# Patient Record
Sex: FEMALE | Race: WHITE | NOT HISPANIC OR LATINO | Employment: FULL TIME | URBAN - METROPOLITAN AREA
[De-identification: names, ages, dates, MRNs, and addresses within clinical notes are randomized per-mention and may not be internally consistent; named-entity substitution may affect disease eponyms.]

---

## 2023-10-16 ENCOUNTER — LAB (OUTPATIENT)
Dept: LAB | Facility: LAB | Age: 53
End: 2023-10-16
Payer: COMMERCIAL

## 2023-10-16 DIAGNOSIS — R73.9 HYPERGLYCEMIA: ICD-10-CM

## 2023-10-16 DIAGNOSIS — E55.9 VITAMIN D DEFICIENCY, UNSPECIFIED: ICD-10-CM

## 2023-10-16 DIAGNOSIS — E53.8 DEFICIENCY OF OTHER SPECIFIED B GROUP VITAMINS: ICD-10-CM

## 2023-10-16 DIAGNOSIS — E03.9 HYPOTHYROIDISM, UNSPECIFIED: Primary | ICD-10-CM

## 2023-10-16 LAB
ALBUMIN SERPL BCP-MCNC: 4.6 G/DL (ref 3.4–5)
ALP SERPL-CCNC: 112 U/L (ref 33–110)
ALT SERPL W P-5'-P-CCNC: 20 U/L (ref 7–45)
ANION GAP SERPL CALC-SCNC: 15 MMOL/L (ref 10–20)
AST SERPL W P-5'-P-CCNC: 22 U/L (ref 9–39)
BASOPHILS # BLD AUTO: 0.05 X10*3/UL (ref 0–0.1)
BASOPHILS NFR BLD AUTO: 0.6 %
BILIRUB SERPL-MCNC: 0.5 MG/DL (ref 0–1.2)
BUN SERPL-MCNC: 17 MG/DL (ref 6–23)
CALCIUM SERPL-MCNC: 9.7 MG/DL (ref 8.6–10.3)
CHLORIDE SERPL-SCNC: 97 MMOL/L (ref 98–107)
CHOLEST SERPL-MCNC: 242 MG/DL (ref 0–199)
CHOLESTEROL/HDL RATIO: 2.6
CO2 SERPL-SCNC: 28 MMOL/L (ref 21–32)
CREAT SERPL-MCNC: 0.6 MG/DL (ref 0.5–1.05)
EOSINOPHIL # BLD AUTO: 0.16 X10*3/UL (ref 0–0.7)
EOSINOPHIL NFR BLD AUTO: 2 %
ERYTHROCYTE [DISTWIDTH] IN BLOOD BY AUTOMATED COUNT: 13.1 % (ref 11.5–14.5)
GFR SERPL CREATININE-BSD FRML MDRD: >90 ML/MIN/1.73M*2
GLUCOSE SERPL-MCNC: 113 MG/DL (ref 74–99)
HCT VFR BLD AUTO: 41.6 % (ref 36–46)
HDLC SERPL-MCNC: 94.8 MG/DL
HGB BLD-MCNC: 13.1 G/DL (ref 12–16)
IMM GRANULOCYTES # BLD AUTO: 0.02 X10*3/UL (ref 0–0.7)
IMM GRANULOCYTES NFR BLD AUTO: 0.2 % (ref 0–0.9)
LDLC SERPL CALC-MCNC: 130 MG/DL
LYMPHOCYTES # BLD AUTO: 1.91 X10*3/UL (ref 1.2–4.8)
LYMPHOCYTES NFR BLD AUTO: 23.6 %
MCH RBC QN AUTO: 28.5 PG (ref 26–34)
MCHC RBC AUTO-ENTMCNC: 31.5 G/DL (ref 32–36)
MCV RBC AUTO: 90 FL (ref 80–100)
MONOCYTES # BLD AUTO: 0.77 X10*3/UL (ref 0.1–1)
MONOCYTES NFR BLD AUTO: 9.5 %
NEUTROPHILS # BLD AUTO: 5.17 X10*3/UL (ref 1.2–7.7)
NEUTROPHILS NFR BLD AUTO: 64.1 %
NON HDL CHOLESTEROL: 147 MG/DL (ref 0–149)
NRBC BLD-RTO: 0 /100 WBCS (ref 0–0)
PLATELET # BLD AUTO: 288 X10*3/UL (ref 150–450)
PMV BLD AUTO: 10.3 FL (ref 7.5–11.5)
POTASSIUM SERPL-SCNC: 4.6 MMOL/L (ref 3.5–5.3)
PROT SERPL-MCNC: 7.2 G/DL (ref 6.4–8.2)
RBC # BLD AUTO: 4.6 X10*6/UL (ref 4–5.2)
SODIUM SERPL-SCNC: 135 MMOL/L (ref 136–145)
T4 FREE SERPL-MCNC: 1.15 NG/DL (ref 0.61–1.12)
TRIGL SERPL-MCNC: 84 MG/DL (ref 0–149)
TSH SERPL-ACNC: 0.26 MIU/L (ref 0.44–3.98)
VLDL: 17 MG/DL (ref 0–40)
WBC # BLD AUTO: 8.1 X10*3/UL (ref 4.4–11.3)

## 2023-10-16 PROCEDURE — 80061 LIPID PANEL: CPT

## 2023-10-16 PROCEDURE — 82607 VITAMIN B-12: CPT

## 2023-10-16 PROCEDURE — 85025 COMPLETE CBC W/AUTO DIFF WBC: CPT

## 2023-10-16 PROCEDURE — 36415 COLL VENOUS BLD VENIPUNCTURE: CPT

## 2023-10-16 PROCEDURE — 80053 COMPREHEN METABOLIC PANEL: CPT

## 2023-10-16 PROCEDURE — 84439 ASSAY OF FREE THYROXINE: CPT

## 2023-10-16 PROCEDURE — 83036 HEMOGLOBIN GLYCOSYLATED A1C: CPT

## 2023-10-16 PROCEDURE — 84443 ASSAY THYROID STIM HORMONE: CPT

## 2023-10-16 PROCEDURE — 82306 VITAMIN D 25 HYDROXY: CPT

## 2023-10-17 DIAGNOSIS — E03.9 HYPOTHYROIDISM, UNSPECIFIED TYPE: Primary | ICD-10-CM

## 2023-10-17 DIAGNOSIS — R73.9 HYPERGLYCEMIA: ICD-10-CM

## 2023-10-17 LAB
25(OH)D3 SERPL-MCNC: 68 NG/ML (ref 30–100)
EST. AVERAGE GLUCOSE BLD GHB EST-MCNC: 105 MG/DL
HBA1C MFR BLD: 5.3 %
VIT B12 SERPL-MCNC: >2000 PG/ML (ref 211–911)

## 2023-10-17 RX ORDER — LEVOTHYROXINE SODIUM 88 UG/1
88 TABLET ORAL DAILY
Qty: 30 TABLET | Refills: 11 | Status: CANCELLED | OUTPATIENT
Start: 2023-10-17 | End: 2024-10-16

## 2023-10-18 PROBLEM — N83.209 OVARIAN CYST: Status: ACTIVE | Noted: 2023-10-18

## 2023-10-18 PROBLEM — N91.2 AMENORRHEA: Status: ACTIVE | Noted: 2023-10-18

## 2023-10-18 PROBLEM — N92.0 MENORRHAGIA: Status: ACTIVE | Noted: 2023-10-18

## 2023-10-18 PROBLEM — N95.1 HOT FLASHES, MENOPAUSAL: Status: ACTIVE | Noted: 2023-10-18

## 2023-10-18 PROBLEM — R92.8 ABNORMAL MAMMOGRAM: Status: ACTIVE | Noted: 2023-10-18

## 2023-10-18 PROBLEM — F17.200 CURRENT EVERY DAY SMOKER: Status: ACTIVE | Noted: 2023-10-18

## 2023-10-18 PROBLEM — F31.9 BIPOLAR AFFECTIVE DISORDER (MULTI): Status: ACTIVE | Noted: 2023-10-18

## 2023-10-18 PROBLEM — R53.83 FATIGUE: Status: ACTIVE | Noted: 2023-10-18

## 2023-10-18 PROBLEM — R55 SYNCOPE: Status: ACTIVE | Noted: 2023-10-18

## 2023-10-18 PROBLEM — E55.9 VITAMIN D DEFICIENCY: Status: ACTIVE | Noted: 2023-10-18

## 2023-10-18 PROBLEM — R51.9 HEADACHE: Status: ACTIVE | Noted: 2023-10-18

## 2023-10-18 PROBLEM — E53.8 VITAMIN B12 DEFICIENCY: Status: ACTIVE | Noted: 2023-10-18

## 2023-10-18 RX ORDER — MULTIVITAMIN/IRON/FOLIC ACID 18MG-0.4MG
1 TABLET ORAL DAILY
COMMUNITY
End: 2023-11-21 | Stop reason: ALTCHOICE

## 2023-10-18 RX ORDER — LAMOTRIGINE 200 MG/1
1 TABLET ORAL 2 TIMES DAILY
COMMUNITY

## 2023-10-18 RX ORDER — ESCITALOPRAM OXALATE 20 MG/1
40 TABLET ORAL DAILY
COMMUNITY

## 2023-10-18 RX ORDER — METHYLPHENIDATE HYDROCHLORIDE 10 MG/1
10 TABLET ORAL 4 TIMES DAILY
COMMUNITY

## 2023-10-18 RX ORDER — POLYETHYLENE GLYCOL 3350, SODIUM CHLORIDE, SODIUM BICARBONATE, POTASSIUM CHLORIDE 420; 11.2; 5.72; 1.48 G/4L; G/4L; G/4L; G/4L
POWDER, FOR SOLUTION ORAL
COMMUNITY
Start: 2023-06-30

## 2023-10-18 RX ORDER — DULOXETIN HYDROCHLORIDE 60 MG/1
60 CAPSULE, DELAYED RELEASE ORAL DAILY
COMMUNITY

## 2023-10-18 RX ORDER — LEVOTHYROXINE SODIUM 88 UG/1
88 TABLET ORAL DAILY
Qty: 90 TABLET | Refills: 0 | Status: SHIPPED | OUTPATIENT
Start: 2023-10-18 | End: 2024-01-29 | Stop reason: SDUPTHER

## 2023-10-18 RX ORDER — DULOXETIN HYDROCHLORIDE 30 MG/1
30 CAPSULE, DELAYED RELEASE ORAL DAILY
COMMUNITY

## 2023-10-18 RX ORDER — BUSPIRONE HYDROCHLORIDE 10 MG/1
10 TABLET ORAL 2 TIMES DAILY
COMMUNITY

## 2023-10-18 RX ORDER — LEVOTHYROXINE SODIUM 100 UG/1
100 TABLET ORAL DAILY
COMMUNITY
End: 2023-10-19 | Stop reason: ALTCHOICE

## 2023-10-18 RX ORDER — ASPIRIN 325 MG
1 TABLET, DELAYED RELEASE (ENTERIC COATED) ORAL DAILY
COMMUNITY
End: 2023-11-21 | Stop reason: ALTCHOICE

## 2023-10-19 ENCOUNTER — OFFICE VISIT (OUTPATIENT)
Dept: PRIMARY CARE | Facility: CLINIC | Age: 53
End: 2023-10-19
Payer: COMMERCIAL

## 2023-10-19 VITALS
HEIGHT: 60 IN | WEIGHT: 111.8 LBS | BODY MASS INDEX: 21.95 KG/M2 | SYSTOLIC BLOOD PRESSURE: 106 MMHG | TEMPERATURE: 97 F | DIASTOLIC BLOOD PRESSURE: 70 MMHG | HEART RATE: 73 BPM

## 2023-10-19 DIAGNOSIS — R74.8 ALKALINE PHOSPHATASE ELEVATION: ICD-10-CM

## 2023-10-19 DIAGNOSIS — R73.9 HYPERGLYCEMIA: ICD-10-CM

## 2023-10-19 DIAGNOSIS — E53.8 VITAMIN B12 DEFICIENCY: ICD-10-CM

## 2023-10-19 DIAGNOSIS — F31.9 BIPOLAR AFFECTIVE DISORDER, REMISSION STATUS UNSPECIFIED (MULTI): ICD-10-CM

## 2023-10-19 DIAGNOSIS — J30.2 SEASONAL ALLERGIC RHINITIS, UNSPECIFIED TRIGGER: ICD-10-CM

## 2023-10-19 DIAGNOSIS — E78.5 HYPERLIPIDEMIA, UNSPECIFIED HYPERLIPIDEMIA TYPE: ICD-10-CM

## 2023-10-19 DIAGNOSIS — F17.200 CURRENT EVERY DAY SMOKER: ICD-10-CM

## 2023-10-19 DIAGNOSIS — E03.9 HYPOTHYROIDISM, UNSPECIFIED TYPE: Primary | ICD-10-CM

## 2023-10-19 PROCEDURE — 99214 OFFICE O/P EST MOD 30 MIN: CPT | Performed by: FAMILY MEDICINE

## 2023-10-19 RX ORDER — LORATADINE 10 MG/1
10 TABLET ORAL DAILY
Qty: 90 TABLET | Refills: 1 | Status: SHIPPED | OUTPATIENT
Start: 2023-10-19 | End: 2024-04-22

## 2023-10-19 ASSESSMENT — PATIENT HEALTH QUESTIONNAIRE - PHQ9
SUM OF ALL RESPONSES TO PHQ9 QUESTIONS 1 AND 2: 0
2. FEELING DOWN, DEPRESSED OR HOPELESS: NOT AT ALL
1. LITTLE INTEREST OR PLEASURE IN DOING THINGS: NOT AT ALL

## 2023-10-19 NOTE — PROGRESS NOTES
Subjective   Patient ID: Donna Pak is a 53 y.o. female who presents for Follow-up.  Patient reports that overall she has been doing well.  She states that for the last month or so she has had some postnasal drip and occasional cough.  She denies any chest pain or shortness of breath.  No fever.  No vomiting or diarrhea.  We reviewed her recent blood work showed her thyroid is overactive.  She has not started her new dose of Synthroid yet.  Her cholesterol and blood sugar was also elevated and she reports that she was not fasting for her blood work.  Her B12 level is also high.  She denies any other concerns.  HPI  Social History     Socioeconomic History    Marital status: Single     Spouse name: Not on file    Number of children: Not on file    Years of education: Not on file    Highest education level: Not on file   Occupational History    Not on file   Tobacco Use    Smoking status: Every Day     Packs/day: .5     Types: Cigarettes    Smokeless tobacco: Never   Substance and Sexual Activity    Alcohol use: Not Currently    Drug use: Never    Sexual activity: Not on file   Other Topics Concern    Not on file   Social History Narrative    Not on file     Social Determinants of Health     Financial Resource Strain: Not on file   Food Insecurity: Not on file   Transportation Needs: Not on file   Physical Activity: Not on file   Stress: Not on file   Social Connections: Not on file   Intimate Partner Violence: Not on file   Housing Stability: Not on file     Current Outpatient Medications   Medication Sig Dispense Refill    busPIRone (Buspar) 10 mg tablet Take 1 tablet (10 mg) by mouth 2 times a day.      cholecalciferol (Vitamin D-3) 50,000 unit capsule Take 1 capsule (50,000 Units) by mouth once daily.      DULoxetine (Cymbalta) 30 mg DR capsule Take 1 capsule (30 mg) by mouth once daily.      DULoxetine (Cymbalta) 60 mg DR capsule Take 1 capsule (60 mg) by mouth once daily.      escitalopram (Lexapro) 20 mg  "tablet Take 2 tablets (40 mg) by mouth once daily.      LaMICtal 200 mg tablet Take 1 tablet (200 mg) by mouth 2 times a day.      methylphenidate (Ritalin) 10 mg tablet Take 1 tablet (10 mg) by mouth 4 times a day.      ASPIRIN ORAL Take 1 tablet by mouth once daily as needed.      b complex 0.4 mg tablet Take 1 tablet by mouth once daily.      levothyroxine (Synthroid, Levoxyl) 88 mcg tablet Take 1 tablet (88 mcg) by mouth once daily. (Patient not taking: Reported on 10/19/2023) 90 tablet 0    loratadine (Claritin) 10 mg tablet Take 1 tablet (10 mg) by mouth once daily. 90 tablet 1    polyethylene glycol-electrolytes 420 gram solution Take as directed       No current facility-administered medications for this visit.     Family History   Problem Relation Name Age of Onset    Lung cancer Mother      Brain cancer Mother      Other (cardiac disorder) Father      Hypertension Father      Breast cancer Father's Sister      Breast cancer Maternal Grandfather      Breast cancer Paternal Grandmother      Other (cardiac disorder) Other uncle      Review of Systems    Review of Systems negative except as noted in HPI and Chief complaint.     Objective                 /70 (BP Location: Left arm, Patient Position: Sitting)   Pulse 73   Temp 36.1 °C (97 °F)   Ht 1.511 m (4' 11.5\")   Wt 50.7 kg (111 lb 12.8 oz)   BMI 22.20 kg/m²    Physical Exam  Vitals reviewed.   HENT:      Head: Normocephalic and atraumatic.      Right Ear: Tympanic membrane normal.      Left Ear: Tympanic membrane normal.      Nose: Nose normal.   Eyes:      Extraocular Movements: Extraocular movements intact.      Conjunctiva/sclera: Conjunctivae normal.      Pupils: Pupils are equal, round, and reactive to light.   Cardiovascular:      Rate and Rhythm: Normal rate and regular rhythm.      Pulses: Normal pulses.   Pulmonary:      Effort: Pulmonary effort is normal.      Breath sounds: Normal breath sounds.   Abdominal:      General: There is no " distension.      Palpations: Abdomen is soft.      Tenderness: There is no abdominal tenderness.   Musculoskeletal:         General: Normal range of motion.      Cervical back: Normal range of motion and neck supple.   Lymphadenopathy:      Cervical: No cervical adenopathy.   Neurological:      General: No focal deficit present.      Mental Status: She is alert.       Results for orders placed or performed in visit on 10/16/23 (from the past 96 hour(s))   CBC and Auto Differential   Result Value Ref Range    WBC 8.1 4.4 - 11.3 x10*3/uL    nRBC 0.0 0.0 - 0.0 /100 WBCs    RBC 4.60 4.00 - 5.20 x10*6/uL    Hemoglobin 13.1 12.0 - 16.0 g/dL    Hematocrit 41.6 36.0 - 46.0 %    MCV 90 80 - 100 fL    MCH 28.5 26.0 - 34.0 pg    MCHC 31.5 (L) 32.0 - 36.0 g/dL    RDW 13.1 11.5 - 14.5 %    Platelets 288 150 - 450 x10*3/uL    MPV 10.3 7.5 - 11.5 fL    Neutrophils % 64.1 40.0 - 80.0 %    Immature Granulocytes %, Automated 0.2 0.0 - 0.9 %    Lymphocytes % 23.6 13.0 - 44.0 %    Monocytes % 9.5 2.0 - 10.0 %    Eosinophils % 2.0 0.0 - 6.0 %    Basophils % 0.6 0.0 - 2.0 %    Neutrophils Absolute 5.17 1.20 - 7.70 x10*3/uL    Immature Granulocytes Absolute, Automated 0.02 0.00 - 0.70 x10*3/uL    Lymphocytes Absolute 1.91 1.20 - 4.80 x10*3/uL    Monocytes Absolute 0.77 0.10 - 1.00 x10*3/uL    Eosinophils Absolute 0.16 0.00 - 0.70 x10*3/uL    Basophils Absolute 0.05 0.00 - 0.10 x10*3/uL   Comprehensive Metabolic Panel   Result Value Ref Range    Glucose 113 (H) 74 - 99 mg/dL    Sodium 135 (L) 136 - 145 mmol/L    Potassium 4.6 3.5 - 5.3 mmol/L    Chloride 97 (L) 98 - 107 mmol/L    Bicarbonate 28 21 - 32 mmol/L    Anion Gap 15 10 - 20 mmol/L    Urea Nitrogen 17 6 - 23 mg/dL    Creatinine 0.60 0.50 - 1.05 mg/dL    eGFR >90 >60 mL/min/1.73m*2    Calcium 9.7 8.6 - 10.3 mg/dL    Albumin 4.6 3.4 - 5.0 g/dL    Alkaline Phosphatase 112 (H) 33 - 110 U/L    Total Protein 7.2 6.4 - 8.2 g/dL    AST 22 9 - 39 U/L    Bilirubin, Total 0.5 0.0 - 1.2 mg/dL     ALT 20 7 - 45 U/L   Lipid Panel   Result Value Ref Range    Cholesterol 242 (H) 0 - 199 mg/dL    HDL-Cholesterol 94.8 mg/dL    Cholesterol/HDL Ratio 2.6     LDL Calculated 130 (H) <=99 mg/dL    VLDL 17 0 - 40 mg/dL    Triglycerides 84 0 - 149 mg/dL    Non HDL Cholesterol 147 0 - 149 mg/dL   TSH with reflex to Free T4 if abnormal   Result Value Ref Range    Thyroid Stimulating Hormone 0.26 (L) 0.44 - 3.98 mIU/L   Vitamin B12   Result Value Ref Range    Vitamin B12 >2,000 (H) 211 - 911 pg/mL   Vitamin D 25-Hydroxy,Total (for eval of Vitamin D levels)   Result Value Ref Range    Vitamin D, 25-Hydroxy, Total 68 30 - 100 ng/mL   Thyroxine, Free   Result Value Ref Range    Thyroxine, Free 1.15 (H) 0.61 - 1.12 ng/dL   Hemoglobin A1C   Result Value Ref Range    Hemoglobin A1C 5.3 see below %    Estimated Average Glucose 105 Not Established mg/dL       Assessment/Plan   Problem List Items Addressed This Visit       Hypothyroidism - Primary     Blood work shows thyroid is overactive.  Decrease Synthroid to 88 mcg daily.  Plan to recheck blood work in 3 months.         Relevant Orders    Tsh With Reflex To Free T4 If Abnormal    Hyperglycemia     Likely related to patient's nonfasting status.  Recheck CMP.  Hemoglobin A1c was normal.         Relevant Orders    Comprehensive Metabolic Panel    Bipolar affective disorder (CMS/[facility])    Current every day smoker     Strongly recommend smoking cessation.         Vitamin B12 deficiency     Patient's vitamin D level is high.  She is going to stop her supplement for now and we will plan to recheck blood work in a few months.         Relevant Orders    Vitamin B12    Seasonal allergic rhinitis     Most likely causing patient's cough and throat clearing.  We will try her on loratadine.  Follow-up in 3 months or sooner with any concerns.         Relevant Medications    loratadine (Claritin) 10 mg tablet    Alkaline phosphatase elevation     Mildly elevated.  Plan to recheck blood  work.         Hyperlipidemia     Cholesterol higher than last check however patient was not fasting.  Continue to work on healthy diet and exercise.  Recheck CMP and lipids.         Relevant Orders    Lipid Panel

## 2023-10-19 NOTE — ASSESSMENT & PLAN NOTE
Patient's vitamin D level is high.  She is going to stop her supplement for now and we will plan to recheck blood work in a few months.

## 2023-10-19 NOTE — ASSESSMENT & PLAN NOTE
Blood work shows thyroid is overactive.  Decrease Synthroid to 88 mcg daily.  Plan to recheck blood work in 3 months.

## 2023-10-19 NOTE — ASSESSMENT & PLAN NOTE
Cholesterol higher than last check however patient was not fasting.  Continue to work on healthy diet and exercise.  Recheck CMP and lipids.

## 2023-10-19 NOTE — ASSESSMENT & PLAN NOTE
Most likely causing patient's cough and throat clearing.  We will try her on loratadine.  Follow-up in 3 months or sooner with any concerns.

## 2023-10-31 ENCOUNTER — APPOINTMENT (OUTPATIENT)
Dept: PRIMARY CARE | Facility: CLINIC | Age: 53
End: 2023-10-31
Payer: COMMERCIAL

## 2023-11-21 ENCOUNTER — ANESTHESIA (OUTPATIENT)
Dept: GASTROENTEROLOGY | Facility: HOSPITAL | Age: 53
End: 2023-11-21
Payer: COMMERCIAL

## 2023-11-21 ENCOUNTER — HOSPITAL ENCOUNTER (OUTPATIENT)
Dept: GASTROENTEROLOGY | Facility: HOSPITAL | Age: 53
Setting detail: OUTPATIENT SURGERY
Discharge: HOME | End: 2023-11-21
Payer: COMMERCIAL

## 2023-11-21 ENCOUNTER — ANESTHESIA EVENT (OUTPATIENT)
Dept: GASTROENTEROLOGY | Facility: HOSPITAL | Age: 53
End: 2023-11-21
Payer: COMMERCIAL

## 2023-11-21 VITALS
WEIGHT: 110 LBS | DIASTOLIC BLOOD PRESSURE: 73 MMHG | SYSTOLIC BLOOD PRESSURE: 143 MMHG | BODY MASS INDEX: 21.6 KG/M2 | RESPIRATION RATE: 16 BRPM | TEMPERATURE: 97.7 F | HEIGHT: 60 IN | OXYGEN SATURATION: 98 % | HEART RATE: 79 BPM

## 2023-11-21 DIAGNOSIS — Z12.11 ENCOUNTER FOR SCREENING FOR MALIGNANT NEOPLASM OF COLON: Primary | ICD-10-CM

## 2023-11-21 PROCEDURE — 45385 COLONOSCOPY W/LESION REMOVAL: CPT | Performed by: INTERNAL MEDICINE

## 2023-11-21 PROCEDURE — A45385 PR COLONOSCOPY,REMV LESN,SNARE: Performed by: ANESTHESIOLOGY

## 2023-11-21 PROCEDURE — 45380 COLONOSCOPY AND BIOPSY: CPT | Performed by: INTERNAL MEDICINE

## 2023-11-21 PROCEDURE — 2500000004 HC RX 250 GENERAL PHARMACY W/ HCPCS (ALT 636 FOR OP/ED): Performed by: NURSE ANESTHETIST, CERTIFIED REGISTERED

## 2023-11-21 PROCEDURE — 2500000005 HC RX 250 GENERAL PHARMACY W/O HCPCS: Performed by: NURSE ANESTHETIST, CERTIFIED REGISTERED

## 2023-11-21 PROCEDURE — 7100000002 HC RECOVERY ROOM TIME - EACH INCREMENTAL 1 MINUTE

## 2023-11-21 PROCEDURE — 3700000002 HC GENERAL ANESTHESIA TIME - EACH INCREMENTAL 1 MINUTE

## 2023-11-21 PROCEDURE — A45385 PR COLONOSCOPY,REMV LESN,SNARE: Performed by: NURSE ANESTHETIST, CERTIFIED REGISTERED

## 2023-11-21 PROCEDURE — 88305 TISSUE EXAM BY PATHOLOGIST: CPT | Performed by: PATHOLOGY

## 2023-11-21 PROCEDURE — 88305 TISSUE EXAM BY PATHOLOGIST: CPT | Mod: TC,SUR,STJLAB | Performed by: INTERNAL MEDICINE

## 2023-11-21 PROCEDURE — 7100000010 HC PHASE TWO TIME - EACH INCREMENTAL 1 MINUTE

## 2023-11-21 PROCEDURE — 3700000001 HC GENERAL ANESTHESIA TIME - INITIAL BASE CHARGE

## 2023-11-21 PROCEDURE — 2500000004 HC RX 250 GENERAL PHARMACY W/ HCPCS (ALT 636 FOR OP/ED): Performed by: INTERNAL MEDICINE

## 2023-11-21 PROCEDURE — 7100000009 HC PHASE TWO TIME - INITIAL BASE CHARGE

## 2023-11-21 PROCEDURE — 88305 TISSUE EXAM BY PATHOLOGIST: CPT | Mod: TC,STJLAB | Performed by: INTERNAL MEDICINE

## 2023-11-21 PROCEDURE — 7100000001 HC RECOVERY ROOM TIME - INITIAL BASE CHARGE

## 2023-11-21 RX ORDER — PROPOFOL 10 MG/ML
INJECTION, EMULSION INTRAVENOUS AS NEEDED
Status: DISCONTINUED | OUTPATIENT
Start: 2023-11-21 | End: 2023-11-21

## 2023-11-21 RX ORDER — SODIUM CHLORIDE, SODIUM LACTATE, POTASSIUM CHLORIDE, CALCIUM CHLORIDE 600; 310; 30; 20 MG/100ML; MG/100ML; MG/100ML; MG/100ML
20 INJECTION, SOLUTION INTRAVENOUS CONTINUOUS
Status: DISCONTINUED | OUTPATIENT
Start: 2023-11-21 | End: 2023-11-22 | Stop reason: HOSPADM

## 2023-11-21 RX ORDER — LIDOCAINE HYDROCHLORIDE 20 MG/ML
INJECTION, SOLUTION EPIDURAL; INFILTRATION; INTRACAUDAL; PERINEURAL AS NEEDED
Status: DISCONTINUED | OUTPATIENT
Start: 2023-11-21 | End: 2023-11-21

## 2023-11-21 RX ORDER — CYANOCOBALAMIN (VITAMIN B-12) 250 MCG
250 TABLET ORAL DAILY
COMMUNITY

## 2023-11-21 RX ADMIN — SODIUM CHLORIDE, POTASSIUM CHLORIDE, SODIUM LACTATE AND CALCIUM CHLORIDE: 600; 310; 30; 20 INJECTION, SOLUTION INTRAVENOUS at 08:19

## 2023-11-21 RX ADMIN — PROPOFOL 50 MG: 10 INJECTION, EMULSION INTRAVENOUS at 08:36

## 2023-11-21 RX ADMIN — PROPOFOL 50 MG: 10 INJECTION, EMULSION INTRAVENOUS at 08:29

## 2023-11-21 RX ADMIN — PROPOFOL 50 MG: 10 INJECTION, EMULSION INTRAVENOUS at 08:32

## 2023-11-21 RX ADMIN — PROPOFOL 50 MG: 10 INJECTION, EMULSION INTRAVENOUS at 08:24

## 2023-11-21 RX ADMIN — PROPOFOL 50 MG: 10 INJECTION, EMULSION INTRAVENOUS at 08:25

## 2023-11-21 RX ADMIN — PROPOFOL 50 MG: 10 INJECTION, EMULSION INTRAVENOUS at 08:30

## 2023-11-21 RX ADMIN — PROPOFOL 50 MG: 10 INJECTION, EMULSION INTRAVENOUS at 08:27

## 2023-11-21 RX ADMIN — PROPOFOL 50 MG: 10 INJECTION, EMULSION INTRAVENOUS at 08:34

## 2023-11-21 RX ADMIN — LIDOCAINE HYDROCHLORIDE 60 MG: 20 INJECTION, SOLUTION EPIDURAL; INFILTRATION; INTRACAUDAL; PERINEURAL at 08:24

## 2023-11-21 ASSESSMENT — PAIN SCALES - GENERAL
PAIN_LEVEL: 0
PAINLEVEL_OUTOF10: 0 - NO PAIN
PAINLEVEL_OUTOF10: 2

## 2023-11-21 ASSESSMENT — PAIN - FUNCTIONAL ASSESSMENT
PAIN_FUNCTIONAL_ASSESSMENT: 0-10

## 2023-11-21 ASSESSMENT — PAIN DESCRIPTION - DESCRIPTORS: DESCRIPTORS: CRAMPING

## 2023-11-21 NOTE — ANESTHESIA POSTPROCEDURE EVALUATION
Patient: Donna Pak    Procedure Summary       Date: 11/21/23 Room / Location: [facility]    Anesthesia Start: 0819 Anesthesia Stop: 0846    Procedure: COLONOSCOPY Diagnosis:       Encounter for screening for malignant neoplasm of colon      Encounter for screening for malignant neoplasm of colon    Scheduled Providers: Chaz Schneider MD Responsible Provider: Mayo Lowry DO    Anesthesia Type: MAC ASA Status: 2            Anesthesia Type: MAC    Vitals Value Taken Time   /75 11/21/23 0846   Temp 36.4 11/21/23 0846   Pulse 78 11/21/23 0846   Resp 14 11/21/23 0846   SpO2 99 11/21/23 0846       Anesthesia Post Evaluation    Patient location during evaluation: PACU  Patient participation: complete - patient participated  Level of consciousness: awake and alert  Pain score: 0  Pain management: adequate  Airway patency: patent  Cardiovascular status: acceptable  Respiratory status: acceptable  Hydration status: acceptable  Postoperative Nausea and Vomiting: none        There were no known notable events for this encounter.

## 2023-11-21 NOTE — PRE-SEDATION DOCUMENTATION
Patient: Donna Pak  MRN: [id number]    Pre-sedation Evaluation:  Sedation necessary for: Immobility and Analgesia  Requesting service: GI    History of Present Illness:   Colon cancer screening      History reviewed. No pertinent past medical history.    Principle problems:  Patient Active Problem List    Diagnosis Date Noted    Body mass index (BMI) 22.0-22.9, adult 10/19/2023    Seasonal allergic rhinitis 10/19/2023    Alkaline phosphatase elevation 10/19/2023    Hyperlipidemia 10/19/2023    Abnormal mammogram 10/18/2023    Amenorrhea 10/18/2023    Bipolar affective disorder (CMS/HCC) 10/18/2023    Current every day smoker 10/18/2023    Fatigue 10/18/2023    Headache 10/18/2023    Hot flashes, menopausal 10/18/2023    Menorrhagia 10/18/2023    Ovarian cyst 10/18/2023    Syncope 10/18/2023    Vitamin B12 deficiency 10/18/2023    Vitamin D deficiency 10/18/2023    Hypothyroidism 10/17/2023    Hyperglycemia 10/17/2023     Allergies:  No Known Allergies  PTA/Current Medications:  (Not in a hospital admission)    Current Outpatient Medications   Medication Sig Dispense Refill    busPIRone (Buspar) 10 mg tablet Take 1 tablet (10 mg) by mouth 2 times a day.      DULoxetine (Cymbalta) 30 mg DR capsule Take 1 capsule (30 mg) by mouth once daily.      DULoxetine (Cymbalta) 60 mg DR capsule Take 1 capsule (60 mg) by mouth once daily.      escitalopram (Lexapro) 20 mg tablet Take 2 tablets (40 mg) by mouth once daily.      LaMICtal 200 mg tablet Take 1 tablet (200 mg) by mouth 2 times a day.      levothyroxine (Synthroid, Levoxyl) 88 mcg tablet Take 1 tablet (88 mcg) by mouth once daily. 90 tablet 0    loratadine (Claritin) 10 mg tablet Take 1 tablet (10 mg) by mouth once daily. 90 tablet 1    methylphenidate (Ritalin) 10 mg tablet Take 1 tablet (10 mg) by mouth 4 times a day.      cyanocobalamin (Vitamin B-12) 250 mcg tablet Take 1 tablet (250 mcg) by mouth once daily.      polyethylene glycol-electrolytes 420 gram  solution Take as directed       Current Facility-Administered Medications   Medication Dose Route Frequency Provider Last Rate Last Admin    lactated Ringer's infusion  20 mL/hr intravenous Continuous Chaz Schneider MD         Past Surgical History:   has a past surgical history that includes Dilation and curettage of uterus (03/06/2014).    Recent sedation/surgery (24 hours) No    Review of Systems:  Please check all that apply: No significant medical history    Pregnancy test completed prior to procedure on any menstruating female: negative        NPO guidelines met: Yes    Physical Exam    Airway  Mallampati: II     Cardiovascular   Rhythm: regular  Rate: normal     Dental    Pulmonary   Breath sounds clear to auscultation         Plan    ASA 2     Deep   (This is my H and P )

## 2023-11-21 NOTE — DISCHARGE INSTRUCTIONS
Patient Instructions after a Colonoscopy      The anesthetics, sedatives or narcotics which were given to you today will be acting in your body for the next 24 hours, so you might feel a little sleepy or groggy.  This feeling should slowly wear off. Carefully read and follow the instructions.     You received sedation today:  - Do not drive or operate any machinery or power tools of any kind.   - No alcoholic beverages today, not even beer or wine.  - Do not make any important decisions or sign any legal documents.  - No over the counter medications that contain alcohol or that may cause drowsiness.  - Do not make any important decisions or sign any legal documents.    While it is common to experience mild to moderate abdominal distention, gas, or belching after your procedure, if any of these symptoms occur following discharge from the GI Lab or within one week of having your procedure, call the [facility] to be advised whether a visit to your nearest Urgent Care or Emergency Department is indicated.  Take this paper with you if you go.     - If you develop an allergic reaction to the medications that were given during your procedure such as difficulty breathing, rash, hives, severe nausea, vomiting or lightheadedness.  - If you experience chest pain, shortness of breath, severe abdominal pain, fevers and chills.  -If you develop signs and symptoms of bleeding such as blood in your spit, if your stools turn black, tarry, or bloody  - If you have not urinated within 8 hours following your procedure.  - If your IV site becomes painful, red, inflamed, or looks infected.    If you received a biopsy/polypectomy/sphincterotomy the following instructions apply below:    __ Do not use Aspirin containing products, non-steroidal medications or anti-coagulants for one week following your procedure. (Examples of these types of medications are: Advil, Arthrotec, Aleve, Coumadin, Ecotrin, Heparin, Ibuprofen,  Indocin, Motrin, Naprosyn, Nuprin, Plavix, Vioxx, and Voltarin, or their generic forms.  This list is not all-inclusive.  Check with your physician or pharmacist before resuming medications.)   __ Eat a soft diet today.  Avoid foods that are poorly digested for the next 24 hours.  These foods would include: nuts, beans, lettuce, red meats, and fried foods. Start with liquids and advance your diet as tolerated, gradually work up to eating solids.   __ Do not have a Barium Study or Enema for one week.    Your physician recommends the additional following instructions:    -You have a contact number available for emergencies. The signs and symptoms of potential delayed complications were discussed with you. You may return to normal activities tomorrow.  -Resume your previous diet.  -Continue your present medications.   -We are waiting for your pathology results.  -Your physician has recommended a repeat colonoscopy (date to be determined after pending pathology results are reviewed) for surveillance based on pathology results.  -The findings and recommendations have been discussed with you.  -The findings and recommendations were discussed with your family.  - Please see Medication Reconciliation Form for new medication/medications prescribed.       If you experience any problems or have any questions following discharge from the GI Lab, please call:        Nurse Signature                                                                        Date___________________                                                                            Patient/Responsible Party Signature                                        Date___________________

## 2023-11-21 NOTE — ANESTHESIA PREPROCEDURE EVALUATION
Patient: Donna Pak    Procedure Information       Date/Time: 11/21/23 0800    Scheduled providers: Chaz Schneider MD    Procedure: COLONOSCOPY    Location: [facility]            Relevant Problems   Cardiovascular   (+) Hyperlipidemia      Endocrine   (+) Hypothyroidism      Neuro/Psych   (+) Bipolar affective disorder (CMS/HCC)       Clinical information reviewed:    Allergies  Meds               NPO Detail:  NPO/Void Status  Carbonhydrate Drink Given Prior to Surgery? : N  Date of Last Liquid: 11/21/23  Time of Last Liquid: 0230  Date of Last Solid: 11/20/23  Time of Last Solid: 0800  Last Intake Type: Clear fluids  Time of Last Void: 0500         Physical Exam    Airway  Mallampati: II  TM distance: >3 FB     Cardiovascular   Rhythm: regular  Rate: normal     Dental - normal exam     Pulmonary   Breath sounds clear to auscultation     Abdominal        Anesthesia Plan    ASA 2     MAC     intravenous induction   Postoperative administration of opioids is intended.  Anesthetic plan and risks discussed with patient.

## 2023-12-01 LAB
LABORATORY COMMENT REPORT: NORMAL
PATH REPORT.FINAL DX SPEC: NORMAL
PATH REPORT.GROSS SPEC: NORMAL
PATH REPORT.RELEVANT HX SPEC: NORMAL
PATH REPORT.TOTAL CANCER: NORMAL

## 2023-12-18 ENCOUNTER — TELEPHONE (OUTPATIENT)
Dept: PRIMARY CARE | Facility: CLINIC | Age: 53
End: 2023-12-18
Payer: COMMERCIAL

## 2023-12-18 NOTE — TELEPHONE ENCOUNTER
Pt left vm wanting to know if you received results from her colonoscopy pathology report, or is this something she should discuss with Gastro that did procedure.

## 2024-01-29 ENCOUNTER — APPOINTMENT (OUTPATIENT)
Dept: PRIMARY CARE | Facility: CLINIC | Age: 54
End: 2024-01-29
Payer: COMMERCIAL

## 2024-01-29 DIAGNOSIS — E03.9 HYPOTHYROIDISM, UNSPECIFIED TYPE: ICD-10-CM

## 2024-01-29 RX ORDER — LEVOTHYROXINE SODIUM 88 UG/1
88 TABLET ORAL DAILY
Qty: 90 TABLET | Refills: 0 | Status: SHIPPED | OUTPATIENT
Start: 2024-01-29 | End: 2024-05-03

## 2024-01-29 NOTE — TELEPHONE ENCOUNTER
Rx Refill Request Telephone Encounter    Name:  Donna Pak  :  [id number]  Medication Name:  levothyroxine (Synthroid, Levoxyl) 88 mcg tablet     Specific Pharmacy location:  [facility]/pharmacy #[id number] - [facility] [address] AT [facility] and [place]  [address], [facility] [address]  Phone: [phone]  Fax: [phone]     Date of last appointment:  10/19/23    Date of next appointment:  2024    Best number to reach patient:   [phone]    Pt asked if she could at least have a 2wk refill until she gets in to come see you.

## 2024-02-12 ENCOUNTER — OFFICE VISIT (OUTPATIENT)
Dept: PRIMARY CARE | Facility: CLINIC | Age: 54
End: 2024-02-12
Payer: COMMERCIAL

## 2024-02-12 VITALS
DIASTOLIC BLOOD PRESSURE: 79 MMHG | SYSTOLIC BLOOD PRESSURE: 124 MMHG | TEMPERATURE: 97.3 F | HEART RATE: 81 BPM | BODY MASS INDEX: 21.64 KG/M2 | HEIGHT: 60 IN | WEIGHT: 110.2 LBS

## 2024-02-12 DIAGNOSIS — F31.9 BIPOLAR AFFECTIVE DISORDER, REMISSION STATUS UNSPECIFIED (MULTI): ICD-10-CM

## 2024-02-12 DIAGNOSIS — R73.9 HYPERGLYCEMIA: ICD-10-CM

## 2024-02-12 DIAGNOSIS — F17.200 CURRENT EVERY DAY SMOKER: ICD-10-CM

## 2024-02-12 DIAGNOSIS — E78.2 MIXED HYPERLIPIDEMIA: ICD-10-CM

## 2024-02-12 DIAGNOSIS — E03.9 HYPOTHYROIDISM, UNSPECIFIED TYPE: Primary | ICD-10-CM

## 2024-02-12 DIAGNOSIS — L30.9 ECZEMA, UNSPECIFIED TYPE: ICD-10-CM

## 2024-02-12 DIAGNOSIS — R74.8 ALKALINE PHOSPHATASE ELEVATION: ICD-10-CM

## 2024-02-12 DIAGNOSIS — F17.210 NICOTINE DEPENDENCE, CIGARETTES, UNCOMPLICATED: ICD-10-CM

## 2024-02-12 PROCEDURE — 99214 OFFICE O/P EST MOD 30 MIN: CPT | Performed by: FAMILY MEDICINE

## 2024-02-12 RX ORDER — TRIAMCINOLONE ACETONIDE 1 MG/G
CREAM TOPICAL 2 TIMES DAILY
Qty: 30 G | Refills: 0 | Status: SHIPPED | OUTPATIENT
Start: 2024-02-12 | End: 2024-03-26

## 2024-02-12 ASSESSMENT — PATIENT HEALTH QUESTIONNAIRE - PHQ9
2. FEELING DOWN, DEPRESSED OR HOPELESS: NOT AT ALL
1. LITTLE INTEREST OR PLEASURE IN DOING THINGS: NOT AT ALL
SUM OF ALL RESPONSES TO PHQ9 QUESTIONS 1 AND 2: 0

## 2024-02-12 NOTE — ASSESSMENT & PLAN NOTE
Patient feels she is euthyroid.  Plan to recheck TSH.  Continue with current dose of levothyroxine 88 mcg daily.

## 2024-02-12 NOTE — ASSESSMENT & PLAN NOTE
Patient does have some dryness around the fingernails.  I did give her a prescription for triamcinolone cream.  Patient to use this sparingly.  Continue with moisturizers and follow-up if symptoms persist.

## 2024-02-12 NOTE — PROGRESS NOTES
Subjective   Patient ID: Donna Pak is a 53 y.o. female who presents for Follow-up: Patient presents today for follow-up on her chronic medical problems.  She reports that overall she has been doing okay.  She states that she has been having more issues with dry skin on her hands.  She has tried multiple different creams and lotions without improvement.  She states that otherwise she has been feeling well.  She has not had a chance to get her blood work done yet.  Her thyroid was off at last check.  B12 was also elevated and her alk phos was mildly elevated.  Her cholesterol was also high but she had not been fasting.  She has been working on eating healthy and exercising.  Reports mood has been good.  She continues follow-up with her psychiatrist.  She continues to smoke and is not interested in quitting at this point.  She is due for her mammogram.  This is already been ordered.  She also is due for screening chest CT and shingles vaccine.  She denies any chest pain or shortness of breath or abdominal pain.  She denies any other concerns.     Past Surgical History:   Procedure Laterality Date    DILATION AND CURETTAGE OF UTERUS  03/06/2014    Dilation And Curettage      Family History   Problem Relation Name Age of Onset    Lung cancer Mother      Brain cancer Mother      Other (cardiac disorder) Father      Hypertension Father      Breast cancer Father's Sister      Breast cancer Maternal Grandfather      Breast cancer Paternal Grandmother      Other (cardiac disorder) Other uncle       Social History     Socioeconomic History    Marital status: Single     Spouse name: Not on file    Number of children: Not on file    Years of education: Not on file    Highest education level: Not on file   Occupational History    Not on file   Tobacco Use    Smoking status: Every Day     Packs/day: .5     Types: Cigarettes     Start date: 2019    Smokeless tobacco: Never    Tobacco comments:     25 pack year history    Substance and Sexual Activity    Alcohol use: Not Currently    Drug use: Never    Sexual activity: Not on file   Other Topics Concern    Not on file   Social History Narrative    Not on file     Social Determinants of Health     Financial Resource Strain: Not on file   Food Insecurity: Not on file   Transportation Needs: Not on file   Physical Activity: Not on file   Stress: Not on file   Social Connections: Not on file   Intimate Partner Violence: Not on file   Housing Stability: Not on file      Patient has no known allergies.   Current Outpatient Medications   Medication Sig Dispense Refill    busPIRone (Buspar) 10 mg tablet Take 1 tablet (10 mg) by mouth 2 times a day.      cyanocobalamin (Vitamin B-12) 250 mcg tablet Take 1 tablet (250 mcg) by mouth once daily.      DULoxetine (Cymbalta) 30 mg DR capsule Take 1 capsule (30 mg) by mouth once daily.      DULoxetine (Cymbalta) 60 mg DR capsule Take 1 capsule (60 mg) by mouth once daily.      escitalopram (Lexapro) 20 mg tablet Take 2 tablets (40 mg) by mouth once daily.      LaMICtal 200 mg tablet Take 1 tablet (200 mg) by mouth 2 times a day.      levothyroxine (Synthroid, Levoxyl) 88 mcg tablet Take 1 tablet (88 mcg) by mouth once daily. 90 tablet 0    loratadine (Claritin) 10 mg tablet Take 1 tablet (10 mg) by mouth once daily. 90 tablet 1    methylphenidate (Ritalin) 10 mg tablet Take 1 tablet (10 mg) by mouth 4 times a day.      polyethylene glycol-electrolytes 420 gram solution Take as directed      triamcinolone (Kenalog) 0.1 % cream Apply topically 2 times a day. Apply to affected area 1-2 times daily as needed. Avoid face and groin. 30 g 0     No current facility-administered medications for this visit.       Immunization History   Administered Date(s) Administered    Moderna SARS-CoV-2 Vaccination 04/21/2021, 05/18/2021    Tdap vaccine, age 7 year and older (BOOSTRIX, ADACEL) 03/22/2016        Review of Systems     Vitals:    02/12/24 0900   BP: 124/79    Pulse: 81   Temp: 36.3 °C (97.3 °F)       Physical Exam  Vitals reviewed.   HENT:      Head: Normocephalic and atraumatic.      Right Ear: Tympanic membrane normal.      Left Ear: Tympanic membrane normal.      Nose: Nose normal.   Eyes:      Extraocular Movements: Extraocular movements intact.      Conjunctiva/sclera: Conjunctivae normal.      Pupils: Pupils are equal, round, and reactive to light.   Cardiovascular:      Rate and Rhythm: Normal rate and regular rhythm.      Pulses: Normal pulses.   Pulmonary:      Effort: Pulmonary effort is normal.      Breath sounds: Normal breath sounds.   Abdominal:      General: There is no distension.      Palpations: Abdomen is soft.      Tenderness: There is no abdominal tenderness.   Musculoskeletal:         General: Normal range of motion.      Cervical back: Normal range of motion and neck supple.      Right lower leg: No edema.      Left lower leg: No edema.   Lymphadenopathy:      Cervical: No cervical adenopathy.   Neurological:      General: No focal deficit present.      Mental Status: She is alert.          @labresults@    Assessment/Plan     Problem List Items Addressed This Visit       Hypothyroidism - Primary    Current Assessment & Plan     Patient feels she is euthyroid.  Plan to recheck TSH.  Continue with current dose of levothyroxine 88 mcg daily.         Hyperglycemia    Current Assessment & Plan     Plan to recheck fasting blood work.         Bipolar affective disorder (CMS/HCC)    Current Assessment & Plan     Continue with follow-up and treatment per psychiatry.         Current every day smoker    Relevant Orders    CT lung screening low dose    Alkaline phosphatase elevation    Current Assessment & Plan     This is mildly elevated.  Plan to recheck blood work including vitamin D level.         Hyperlipidemia    Current Assessment & Plan     Continue to work on healthy diet and exercise.  Plan to recheck fasting blood work.         Eczema    Current  Assessment & Plan     Patient does have some dryness around the fingernails.  I did give her a prescription for triamcinolone cream.  Patient to use this sparingly.  Continue with moisturizers and follow-up if symptoms persist.         Relevant Medications    triamcinolone (Kenalog) 0.1 % cream    Nicotine dependence, cigarettes, uncomplicated    Current Assessment & Plan     Strongly recommend smoking cessation.  Also recommend screening chest CT which was reordered.         Relevant Orders    CT lung screening low dose

## 2024-03-24 DIAGNOSIS — L30.9 ECZEMA, UNSPECIFIED TYPE: ICD-10-CM

## 2024-03-26 RX ORDER — TRIAMCINOLONE ACETONIDE 1 MG/G
CREAM TOPICAL 2 TIMES DAILY
Qty: 15 G | Refills: 0 | Status: SHIPPED | OUTPATIENT
Start: 2024-03-26

## 2024-03-26 NOTE — TELEPHONE ENCOUNTER
T/c to pt, lmom, advised pt that pharmacy is requesting a refill of the Triamcinolone 0.1% cream,  advised pt that a small refill was sent, advised pt that this is to be used sparingly around cuticles for dryness, not to be used daily. Asked pt to give the office a call back with any questions or concerns.

## 2024-03-26 NOTE — TELEPHONE ENCOUNTER
This was rx'd last month to use sparingly around dryness at cuticles.  Do not use daily.  Small rf submitted.

## 2024-04-20 DIAGNOSIS — J30.2 SEASONAL ALLERGIC RHINITIS, UNSPECIFIED TRIGGER: ICD-10-CM

## 2024-04-22 RX ORDER — LORATADINE 10 MG/1
10 TABLET ORAL DAILY
Qty: 90 TABLET | Refills: 1 | Status: SHIPPED | OUTPATIENT
Start: 2024-04-22

## 2024-05-02 DIAGNOSIS — E03.9 HYPOTHYROIDISM, UNSPECIFIED TYPE: ICD-10-CM

## 2024-05-03 RX ORDER — LEVOTHYROXINE SODIUM 88 UG/1
88 TABLET ORAL DAILY
Qty: 30 TABLET | Refills: 0 | Status: SHIPPED | OUTPATIENT
Start: 2024-05-03 | End: 2024-05-28

## 2024-05-26 DIAGNOSIS — E03.9 HYPOTHYROIDISM, UNSPECIFIED TYPE: ICD-10-CM

## 2024-05-28 RX ORDER — LEVOTHYROXINE SODIUM 88 UG/1
88 TABLET ORAL DAILY
Qty: 30 TABLET | Refills: 0 | Status: SHIPPED | OUTPATIENT
Start: 2024-05-28

## 2024-06-25 DIAGNOSIS — E03.9 HYPOTHYROIDISM, UNSPECIFIED TYPE: ICD-10-CM

## 2024-06-26 RX ORDER — LEVOTHYROXINE SODIUM 88 UG/1
88 TABLET ORAL DAILY
Qty: 30 TABLET | Refills: 0 | Status: SHIPPED | OUTPATIENT
Start: 2024-06-26

## 2024-06-26 NOTE — TELEPHONE ENCOUNTER
Pt will be having a new medical insurance starting July 1st.  Pt states she is going to wait till everything kicks in and then have Bw done and schedule appointment.

## 2024-07-10 DIAGNOSIS — E03.9 HYPOTHYROIDISM, UNSPECIFIED TYPE: ICD-10-CM

## 2024-07-12 RX ORDER — LEVOTHYROXINE SODIUM 88 UG/1
88 TABLET ORAL DAILY
Qty: 30 TABLET | Refills: 0 | Status: SHIPPED | OUTPATIENT
Start: 2024-07-12

## 2024-08-22 DIAGNOSIS — E03.9 HYPOTHYROIDISM, UNSPECIFIED TYPE: ICD-10-CM

## 2024-08-22 RX ORDER — LEVOTHYROXINE SODIUM 88 UG/1
88 TABLET ORAL DAILY
Qty: 30 TABLET | Refills: 0 | Status: SHIPPED | OUTPATIENT
Start: 2024-08-22

## 2024-08-22 NOTE — TELEPHONE ENCOUNTER
Rx Refill Request Telephone Encounter    Name:  Donna Pak  :  [id number]  Medication Name:  levothyroxine  Specific Pharmacy location:  [facility]  Date of last appointment:  24  Date of next appointment: 10/31/24  Best number to reach patient:      Patient has not had a chance to do BW yet because she has COVID.   She will be completely out of this medication tomorrow.

## 2024-10-08 ENCOUNTER — TELEPHONE (OUTPATIENT)
Dept: PRIMARY CARE | Facility: CLINIC | Age: 54
End: 2024-10-08
Payer: COMMERCIAL

## 2024-10-08 NOTE — TELEPHONE ENCOUNTER
Rx Refill Request Telephone Encounter    Name:  Donna Pak  :  [id number]  Medication Name:    levothyroxine (Synthroid, Levoxyl) 88 mcg tablet   2 weeks to last until appointment    Specific Pharmacy location:    [facility]/pharmacy #[id number] - [facility] [address] [facility]  [address], [facility] [address]  Phone: [phone]  Fax: [phone]  LATOYA #: [id number]     Date of last appointment:  24    Date of next appointment:  10/31/24

## 2024-10-09 DIAGNOSIS — E03.9 HYPOTHYROIDISM, UNSPECIFIED TYPE: ICD-10-CM

## 2024-10-09 RX ORDER — LEVOTHYROXINE SODIUM 88 UG/1
88 TABLET ORAL DAILY
Qty: 30 TABLET | Refills: 0 | Status: SHIPPED | OUTPATIENT
Start: 2024-10-09

## 2024-10-31 ENCOUNTER — APPOINTMENT (OUTPATIENT)
Dept: PRIMARY CARE | Facility: CLINIC | Age: 54
End: 2024-10-31
Payer: COMMERCIAL

## 2024-10-31 ENCOUNTER — APPOINTMENT (OUTPATIENT)
Dept: PRIMARY CARE | Facility: CLINIC | Age: 54
End: 2024-10-31
Payer: MEDICARE

## 2024-10-31 DIAGNOSIS — E03.9 HYPOTHYROIDISM, UNSPECIFIED TYPE: ICD-10-CM

## 2024-11-04 RX ORDER — LEVOTHYROXINE SODIUM 88 UG/1
88 TABLET ORAL DAILY
Qty: 30 TABLET | Refills: 0 | Status: SHIPPED | OUTPATIENT
Start: 2024-11-04

## 2024-11-05 ENCOUNTER — APPOINTMENT (OUTPATIENT)
Dept: PRIMARY CARE | Facility: CLINIC | Age: 54
End: 2024-11-05
Payer: COMMERCIAL

## 2024-12-07 DIAGNOSIS — E03.9 HYPOTHYROIDISM, UNSPECIFIED TYPE: ICD-10-CM

## 2024-12-09 RX ORDER — LEVOTHYROXINE SODIUM 88 UG/1
88 TABLET ORAL DAILY
Qty: 30 TABLET | Refills: 0 | Status: SHIPPED | OUTPATIENT
Start: 2024-12-09

## 2024-12-10 PROBLEM — Z00.00 HEALTHCARE MAINTENANCE: Status: ACTIVE | Noted: 2024-12-10

## 2024-12-17 ENCOUNTER — APPOINTMENT (OUTPATIENT)
Dept: PRIMARY CARE | Facility: CLINIC | Age: 54
End: 2024-12-17
Payer: COMMERCIAL

## 2024-12-17 VITALS
BODY MASS INDEX: 22.74 KG/M2 | HEART RATE: 94 BPM | TEMPERATURE: 98.6 F | HEIGHT: 59 IN | WEIGHT: 112.8 LBS | DIASTOLIC BLOOD PRESSURE: 89 MMHG | SYSTOLIC BLOOD PRESSURE: 139 MMHG

## 2024-12-17 DIAGNOSIS — K59.00 CONSTIPATION, UNSPECIFIED CONSTIPATION TYPE: ICD-10-CM

## 2024-12-17 DIAGNOSIS — R73.9 HYPERGLYCEMIA: ICD-10-CM

## 2024-12-17 DIAGNOSIS — E03.9 HYPOTHYROIDISM, UNSPECIFIED TYPE: ICD-10-CM

## 2024-12-17 DIAGNOSIS — N95.1 HOT FLASHES, MENOPAUSAL: ICD-10-CM

## 2024-12-17 DIAGNOSIS — Z87.891 PERSONAL HISTORY OF NICOTINE DEPENDENCE: ICD-10-CM

## 2024-12-17 DIAGNOSIS — Z12.31 SCREENING MAMMOGRAM FOR BREAST CANCER: ICD-10-CM

## 2024-12-17 DIAGNOSIS — E53.8 VITAMIN B12 DEFICIENCY: ICD-10-CM

## 2024-12-17 DIAGNOSIS — Z00.00 ROUTINE GENERAL MEDICAL EXAMINATION AT HEALTH CARE FACILITY: ICD-10-CM

## 2024-12-17 DIAGNOSIS — R53.83 FATIGUE, UNSPECIFIED TYPE: ICD-10-CM

## 2024-12-17 DIAGNOSIS — F17.210 NICOTINE DEPENDENCE, CIGARETTES, UNCOMPLICATED: ICD-10-CM

## 2024-12-17 DIAGNOSIS — F17.200 CURRENT EVERY DAY SMOKER: ICD-10-CM

## 2024-12-17 DIAGNOSIS — E55.9 VITAMIN D DEFICIENCY: ICD-10-CM

## 2024-12-17 DIAGNOSIS — Z00.00 HEALTHCARE MAINTENANCE: Primary | ICD-10-CM

## 2024-12-17 PROBLEM — N90.89 LESION OF VULVA: Status: ACTIVE | Noted: 2024-12-17

## 2024-12-17 PROCEDURE — 3008F BODY MASS INDEX DOCD: CPT | Performed by: FAMILY MEDICINE

## 2024-12-17 PROCEDURE — G0439 PPPS, SUBSEQ VISIT: HCPCS | Performed by: FAMILY MEDICINE

## 2024-12-17 PROCEDURE — 99214 OFFICE O/P EST MOD 30 MIN: CPT | Performed by: FAMILY MEDICINE

## 2024-12-17 PROCEDURE — G0296 VISIT TO DETERM LDCT ELIG: HCPCS | Performed by: FAMILY MEDICINE

## 2024-12-17 ASSESSMENT — ACTIVITIES OF DAILY LIVING (ADL)
TAKING_MEDICATION: INDEPENDENT
BATHING: INDEPENDENT
MANAGING_FINANCES: INDEPENDENT
GROCERY_SHOPPING: INDEPENDENT
DRESSING: INDEPENDENT
DOING_HOUSEWORK: INDEPENDENT

## 2024-12-17 NOTE — ASSESSMENT & PLAN NOTE
Strongly recommend smoking cessation.  Reviewed screening chest CT due to patient's smoking history.  Discussed risks and benefits and patient is agreeable to proceeding.

## 2024-12-17 NOTE — ASSESSMENT & PLAN NOTE
Continue to work on healthy diet and exercise.  Screening blood work ordered.  Pap smear up-to-date.  Mammogram ordered.  Screen colon cancer screening up-to-date.  Screening chest CT ordered.  Recommend shingles vaccine and flu shot.

## 2024-12-17 NOTE — ASSESSMENT & PLAN NOTE
Strongly recommend smoking cessation.  Reviewed screening chest CT due to patient's smoking history.  Discussed risks and benefits and patient is agreeable to proceeding.    Orders:    CT lung screening low dose; Future

## 2024-12-17 NOTE — ASSESSMENT & PLAN NOTE
Patient reports persisting constipation.  To see GI for further evaluation.    Orders:    Referral to Gastroenterology; Future

## 2024-12-17 NOTE — ASSESSMENT & PLAN NOTE
Recheck vitamin D level.  Continue with supplement.    Orders:    Vitamin D 25-Hydroxy,Total (for eval of Vitamin D levels); Future

## 2024-12-17 NOTE — PROGRESS NOTES
"Subjective   Reason for Visit: Donna Pak is an 54 y.o. female here for a Medicare Wellness visit.  Patient presents today for an annual wellness visit and follow-up on her chronic medical problems.  She reports that she continues to have constipation and bloating.  She has had this she states since she was a child.  She has had a colonoscopy.  She states that she uses Ex-Lax and this helps a little bit.  She also reports feeling very tired.  States that she has difficulty sleeping.  She does continue follow-up with her psychiatrist.  She has not had her blood work checked recently.  She states that she feels like a lot of her symptoms started after she went through menopause.  She does report hot flashes also.  She is interested in seeing GYN for this.  She denies any chest pain or shortness of breath.  She denies any other concerns.    Past Medical, Surgical, and Family History reviewed and updated in chart.    Reviewed all medications by prescribing practitioner or clinical pharmacist (such as prescriptions, OTCs, herbal therapies and supplements) and documented in the medical record.    HPI    Patient Care Team:  Kristen Chacon MD as PCP - General  Kristen Chacon MD as PCP - [facility] PCP     Review of Systems    Objective   Vitals:  /89 (BP Location: Left arm, Patient Position: Sitting)   Pulse 94   Temp 37 °C (98.6 °F)   Ht 1.499 m (4' 11\")   Wt 51.2 kg (112 lb 12.8 oz)   BMI 22.78 kg/m²       Physical Exam  HENT:      Head: Normocephalic.      Right Ear: Tympanic membrane normal.      Left Ear: Tympanic membrane normal.      Mouth/Throat:      Mouth: Mucous membranes are moist.      Pharynx: Oropharynx is clear.   Eyes:      Extraocular Movements: Extraocular movements intact.      Conjunctiva/sclera: Conjunctivae normal.      Pupils: Pupils are equal, round, and reactive to light.   Cardiovascular:      Rate and Rhythm: Normal rate and regular rhythm.      Pulses: Normal pulses. "   Pulmonary:      Effort: Pulmonary effort is normal.      Breath sounds: Normal breath sounds.   Chest:   Breasts:     Right: Normal. No mass, nipple discharge, skin change or tenderness.      Left: Normal. No mass, nipple discharge, skin change or tenderness.   Abdominal:      General: There is no distension.      Palpations: Abdomen is soft.      Tenderness: There is no abdominal tenderness.   Musculoskeletal:         General: Normal range of motion.      Cervical back: Neck supple.   Lymphadenopathy:      Cervical: No cervical adenopathy.   Skin:     General: Skin is warm and dry.   Neurological:      General: No focal deficit present.      Mental Status: She is alert.         Assessment & Plan  Healthcare maintenance  Continue to work on healthy diet and exercise.  Screening blood work ordered.  Pap smear up-to-date.  Mammogram ordered.  Screen colon cancer screening up-to-date.  Screening chest CT ordered.  Recommend shingles vaccine and flu shot.         Hypothyroidism, unspecified type  Patient complaining of significant fatigue.  Recommend checking blood work including TSH as soon as she can.    Orders:    TSH with reflex to Free T4 if abnormal; Future    Comprehensive Metabolic Panel; Future    CBC and Auto Differential; Future    Lipid Panel; Future    Hyperglycemia  Blood sugars been mildly elevated in the past.  Continue with healthy diet and exercise.  Check CMP and hemoglobin A1c.    Orders:    Hemoglobin A1c; Future    Current every day smoker  Strongly recommend smoking cessation.  Reviewed screening chest CT due to patient's smoking history.  Discussed risks and benefits and patient is agreeable to proceeding.    Orders:    CT lung screening low dose; Future    Constipation, unspecified constipation type  Patient reports persisting constipation.  To see GI for further evaluation.    Orders:    Referral to Gastroenterology; Future    Fatigue, unspecified type  Check blood work as ordered.          Screening mammogram for breast cancer    Orders:    BI mammo bilateral screening tomosynthesis; Future    Vitamin B12 deficiency  Recheck vitamin B12 level.  Continue with supplement.    Orders:    Vitamin B12; Future    Hot flashes, menopausal    Orders:    Referral to Obstetrics / Gynecology; Future    Vitamin D deficiency  Recheck vitamin D level.  Continue with supplement.    Orders:    Vitamin D 25-Hydroxy,Total (for eval of Vitamin D levels); Future    Personal history of nicotine dependence    Orders:    CT lung screening low dose; Future    Routine general medical examination at health care facility    Orders:    1 Year Follow Up In Primary Care - Wellness Exam; Future    Nicotine dependence, cigarettes, uncomplicated  Strongly recommend smoking cessation.  Reviewed screening chest CT due to patient's smoking history.  Discussed risks and benefits and patient is agreeable to proceeding.

## 2024-12-17 NOTE — ASSESSMENT & PLAN NOTE
Patient complaining of significant fatigue.  Recommend checking blood work including TSH as soon as she can.    Orders:    TSH with reflex to Free T4 if abnormal; Future    Comprehensive Metabolic Panel; Future    CBC and Auto Differential; Future    Lipid Panel; Future

## 2024-12-17 NOTE — ASSESSMENT & PLAN NOTE
Blood sugars been mildly elevated in the past.  Continue with healthy diet and exercise.  Check CMP and hemoglobin A1c.    Orders:    Hemoglobin A1c; Future

## 2024-12-23 DIAGNOSIS — E03.9 HYPOTHYROIDISM, UNSPECIFIED TYPE: ICD-10-CM

## 2024-12-27 RX ORDER — LEVOTHYROXINE SODIUM 88 UG/1
88 TABLET ORAL DAILY
Qty: 90 TABLET | Refills: 1 | Status: SHIPPED | OUTPATIENT
Start: 2024-12-27

## 2025-06-03 ENCOUNTER — APPOINTMENT (OUTPATIENT)
Dept: PRIMARY CARE | Facility: CLINIC | Age: 55
End: 2025-06-03
Payer: COMMERCIAL

## 2025-06-03 VITALS
HEART RATE: 106 BPM | DIASTOLIC BLOOD PRESSURE: 86 MMHG | HEIGHT: 59 IN | BODY MASS INDEX: 21.37 KG/M2 | WEIGHT: 106 LBS | SYSTOLIC BLOOD PRESSURE: 125 MMHG | TEMPERATURE: 97.3 F

## 2025-06-03 DIAGNOSIS — E78.2 MIXED HYPERLIPIDEMIA: ICD-10-CM

## 2025-06-03 DIAGNOSIS — E55.9 VITAMIN D DEFICIENCY: ICD-10-CM

## 2025-06-03 DIAGNOSIS — F17.200 CURRENT EVERY DAY SMOKER: ICD-10-CM

## 2025-06-03 DIAGNOSIS — E53.8 VITAMIN B12 DEFICIENCY: ICD-10-CM

## 2025-06-03 DIAGNOSIS — E03.9 HYPOTHYROIDISM, UNSPECIFIED TYPE: ICD-10-CM

## 2025-06-03 DIAGNOSIS — H00.025 HORDEOLUM INTERNUM OF LEFT LOWER EYELID: Primary | ICD-10-CM

## 2025-06-03 DIAGNOSIS — F31.9 BIPOLAR AFFECTIVE DISORDER, REMISSION STATUS UNSPECIFIED (MULTI): ICD-10-CM

## 2025-06-03 DIAGNOSIS — R73.9 HYPERGLYCEMIA: ICD-10-CM

## 2025-06-03 PROCEDURE — G2211 COMPLEX E/M VISIT ADD ON: HCPCS | Performed by: FAMILY MEDICINE

## 2025-06-03 PROCEDURE — 3008F BODY MASS INDEX DOCD: CPT | Performed by: FAMILY MEDICINE

## 2025-06-03 PROCEDURE — 99214 OFFICE O/P EST MOD 30 MIN: CPT | Performed by: FAMILY MEDICINE

## 2025-06-03 ASSESSMENT — PATIENT HEALTH QUESTIONNAIRE - PHQ9
2. FEELING DOWN, DEPRESSED OR HOPELESS: NOT AT ALL
SUM OF ALL RESPONSES TO PHQ9 QUESTIONS 1 AND 2: 0
1. LITTLE INTEREST OR PLEASURE IN DOING THINGS: NOT AT ALL

## 2025-06-03 NOTE — PROGRESS NOTES
Subjective   Patient ID: Dnona Pak is a 55 y.o. female who presents for Follow-up (C/o bump on left eye).  Patient presents today with concerns about a bump in her left lower eyelid.  She states that it has been there for a few weeks.  States it is not painful but she can noticed that it is bothersome.  She states that she rubs her eyes a lot but does not think that she has gotten anything in them and has not had any injury.  She is not having any visual changes.  She denies any headaches.  No drainage.  No chest pain or shortness of breath or abdominal pain.  She denies any other concerns.  HPI  Social History     Socioeconomic History    Marital status: Single     Spouse name: Not on file    Number of children: Not on file    Years of education: Not on file    Highest education level: Not on file   Occupational History    Not on file   Tobacco Use    Smoking status: Every Day     Current packs/day: 0.50     Average packs/day: 0.9 packs/day for 34.4 years (31.2 ttl pk-yrs)     Types: Cigarettes     Start date: 1991    Smokeless tobacco: Never    Tobacco comments:     25 pack year history   Substance and Sexual Activity    Alcohol use: Not Currently    Drug use: Never    Sexual activity: Not on file   Other Topics Concern    Not on file   Social History Narrative    Not on file     Social Drivers of Health     Financial Resource Strain: Not on file   Food Insecurity: Not on file   Transportation Needs: Not on file   Physical Activity: Not on file   Stress: Not on file   Social Connections: Not on file   Intimate Partner Violence: Not on file   Housing Stability: Not on file     Current Medications[1]  Family History[2]  Review of Systems  Immunization History   Administered Date(s) Administered    Moderna SARS-CoV-2 Vaccination 04/21/2021, 05/18/2021    Tdap vaccine, age 7 year and older (BOOSTRIX, ADACEL) 03/22/2016       Review of Systems negative except as noted in HPI and Chief complaint.     Objective  "                /86 (BP Location: Left arm, Patient Position: Sitting)   Pulse 106   Temp 36.3 °C (97.3 °F)   Ht 1.499 m (4' 11\")   Wt 48.1 kg (106 lb)   BMI 21.41 kg/m²    Physical Exam  Vitals reviewed.   HENT:      Head: Normocephalic and atraumatic.      Right Ear: Tympanic membrane normal.      Left Ear: Tympanic membrane normal.      Nose: Nose normal.      Mouth/Throat:      Pharynx: Oropharynx is clear.   Eyes:      General:         Left eye: Hordeolum present.     Extraocular Movements: Extraocular movements intact.      Conjunctiva/sclera: Conjunctivae normal.      Pupils: Pupils are equal, round, and reactive to light.      Comments: Inside the lower lateral left eyelid   Cardiovascular:      Rate and Rhythm: Normal rate and regular rhythm.      Pulses: Normal pulses.   Pulmonary:      Effort: Pulmonary effort is normal.      Breath sounds: Normal breath sounds.   Abdominal:      General: There is no distension.      Palpations: Abdomen is soft.      Tenderness: There is no abdominal tenderness.   Musculoskeletal:         General: Normal range of motion.      Cervical back: Normal range of motion and neck supple.      Right lower leg: No edema.      Left lower leg: No edema.   Lymphadenopathy:      Cervical: No cervical adenopathy.   Neurological:      General: No focal deficit present.      Mental Status: She is alert.       No results found for this or any previous visit (from the past 96 hours).  Lab Results   Component Value Date    WBC 8.1 10/16/2023    HGB 13.1 10/16/2023    HCT 41.6 10/16/2023    MCV 90 10/16/2023     10/16/2023     Lab Results   Component Value Date    GLUCOSE 113 (H) 10/16/2023    CALCIUM 9.7 10/16/2023     (L) 10/16/2023    K 4.6 10/16/2023    CO2 28 10/16/2023    CL 97 (L) 10/16/2023    BUN 17 10/16/2023    CREATININE 0.60 10/16/2023     Lab Results   Component Value Date    CHOL 242 (H) 10/16/2023    CHOL 193 02/10/2022    CHOL 225 (H) 09/13/2019 " "    Lab Results   Component Value Date    HDL 94.8 10/16/2023    HDL 76.0 02/10/2022    HDL 80.0 09/13/2019     Lab Results   Component Value Date    LDLCALC 130 (H) 10/16/2023     Lab Results   Component Value Date    TRIG 84 10/16/2023    TRIG 87 02/10/2022    TRIG 176 (H) 09/13/2019     No components found for: \"CHOLHDL\"   Lab Results   Component Value Date    TSH 0.26 (L) 10/16/2023      Lab Results   Component Value Date    HGBA1C 5.3 10/16/2023      No results found for: \"ALBUMINUR\"   Assessment/Plan   Problem List Items Addressed This Visit       Hypothyroidism    Discussed with patient that her last TSH was not normal and it has been over a year and a half since she has had blood work checked.  Strongly recommend she have this checked as soon as possible.  Continue with current dose of Synthroid.         Relevant Orders    TSH with reflex to Free T4 if abnormal    Hyperglycemia    Blood sugars been mildly elevated.  Continue to work on healthy diet and exercise and check CMP and hemoglobin A1c.         Relevant Orders    Hemoglobin A1C    Bipolar affective disorder (Multi)    Continue with treatment per psychiatry.         Current every day smoker    Strongly recommend smoking cessation.  Screening chest CT previously ordered.         Vitamin B12 deficiency    Recheck B12 level.  Continue with supplement.         Relevant Orders    Vitamin B12    Vitamin D deficiency    Recheck vitamin D level.  Continue with supplement.         Relevant Orders    Vitamin D 25-Hydroxy,Total (for eval of Vitamin D levels)    Hyperlipidemia    Cholesterols been elevated in the past.  Continue with healthy diet and exercise.  Check CMP and lipids.         Relevant Orders    Lipid Panel    Comprehensive Metabolic Panel    CBC and Auto Differential    Hordeolum internum of left lower eyelid - Primary    Recommend using warm compresses and will have patient see ophthalmology for further evaluation.         Relevant Orders    " Referral to Ophthalmology                 [1]   Current Outpatient Medications   Medication Sig Dispense Refill    busPIRone (Buspar) 10 mg tablet Take 0.5 tablets (5 mg) by mouth once daily.      cyanocobalamin (Vitamin B-12) 250 mcg tablet Take 1 tablet (250 mcg) by mouth once daily.      DULoxetine (Cymbalta) 30 mg DR capsule Take 1 capsule (30 mg) by mouth once daily.      DULoxetine (Cymbalta) 60 mg DR capsule Take 1 capsule (60 mg) by mouth once daily.      escitalopram (Lexapro) 20 mg tablet Take 2 tablets (40 mg) by mouth once daily.      LaMICtal 200 mg tablet Take 1 tablet (200 mg) by mouth 2 times a day.      levothyroxine (Synthroid, Levoxyl) 88 mcg tablet TAKE 1 TABLET BY MOUTH EVERY DAY 90 tablet 1    loratadine (Claritin) 10 mg tablet TAKE 1 TABLET BY MOUTH EVERY DAY (Patient taking differently: Take 1 tablet (10 mg) by mouth once daily as needed for allergies.) 90 tablet 1    methylphenidate (Ritalin) 10 mg tablet Take 1 tablet (10 mg) by mouth 4 times a day.      polyethylene glycol-electrolytes 420 gram solution Take as directed      triamcinolone (Kenalog) 0.1 % cream APPLY TOPICALLY 2 TIMES A DAY. APPLY TO AFFECTED AREA 1-2 TIMES DAILY AS NEEDED. AVOID FACE AND GROIN. 15 g 0     No current facility-administered medications for this visit.   [2]   Family History  Problem Relation Name Age of Onset    Lung cancer Mother      Brain cancer Mother      Other (cardiac disorder) Father      Hypertension Father      Breast cancer Father's Sister      Breast cancer Maternal Grandfather      Breast cancer Paternal Grandmother      Other (cardiac disorder) Other uncle

## 2025-06-03 NOTE — ASSESSMENT & PLAN NOTE
Discussed with patient that her last TSH was not normal and it has been over a year and a half since she has had blood work checked.  Strongly recommend she have this checked as soon as possible.  Continue with current dose of Synthroid.

## 2025-06-03 NOTE — ASSESSMENT & PLAN NOTE
Blood sugars been mildly elevated.  Continue to work on healthy diet and exercise and check CMP and hemoglobin A1c.

## 2025-06-03 NOTE — ASSESSMENT & PLAN NOTE
Cholesterols been elevated in the past.  Continue with healthy diet and exercise.  Check CMP and lipids.

## 2025-06-10 ENCOUNTER — APPOINTMENT (OUTPATIENT)
Dept: OPHTHALMOLOGY | Facility: CLINIC | Age: 55
End: 2025-06-10
Payer: COMMERCIAL

## 2025-06-10 DIAGNOSIS — H01.02B SQUAMOUS BLEPHARITIS OF UPPER AND LOWER EYELIDS OF BOTH EYES: ICD-10-CM

## 2025-06-10 DIAGNOSIS — H00.025 HORDEOLUM INTERNUM OF LEFT LOWER EYELID: ICD-10-CM

## 2025-06-10 DIAGNOSIS — H01.02A SQUAMOUS BLEPHARITIS OF UPPER AND LOWER EYELIDS OF BOTH EYES: ICD-10-CM

## 2025-06-10 DIAGNOSIS — B88.0 INFESTATION BY DEMODEX: Primary | ICD-10-CM

## 2025-06-10 PROCEDURE — 99070(U24) BRUDER EYE MASK: Performed by: OPTOMETRIST

## 2025-06-10 PROCEDURE — 92002 INTRM OPH EXAM NEW PATIENT: CPT | Performed by: OPTOMETRIST

## 2025-06-10 PROCEDURE — ASTAX: Performed by: OPTOMETRIST

## 2025-06-10 RX ORDER — LOTILANER OPHTHALMIC SOLUTION 2.5 MG/ML
1 SOLUTION/ DROPS OPHTHALMIC EVERY 12 HOURS
Qty: 10 ML | Refills: 0 | Status: SHIPPED | OUTPATIENT
Start: 2025-06-10 | End: 2025-07-22

## 2025-06-10 RX ORDER — DOXYCYCLINE HYCLATE 100 MG
100 TABLET ORAL 2 TIMES DAILY
Qty: 28 TABLET | Refills: 0 | Status: SHIPPED | OUTPATIENT
Start: 2025-06-10 | End: 2025-06-24

## 2025-06-10 ASSESSMENT — ENCOUNTER SYMPTOMS
ENDOCRINE NEGATIVE: 0
RESPIRATORY NEGATIVE: 0
CARDIOVASCULAR NEGATIVE: 0
NEUROLOGICAL NEGATIVE: 0
GASTROINTESTINAL NEGATIVE: 0
PSYCHIATRIC NEGATIVE: 1
HEMATOLOGIC/LYMPHATIC NEGATIVE: 0
ALLERGIC/IMMUNOLOGIC NEGATIVE: 0
EYES NEGATIVE: 1
CONSTITUTIONAL NEGATIVE: 0
MUSCULOSKELETAL NEGATIVE: 0

## 2025-06-10 ASSESSMENT — REFRACTION_WEARINGRX
OS_AXIS: 080
OS_CYLINDER: -1.75
OD_AXIS: 080
OD_SPHERE: -1.25
OS_SPHERE: -2.50
OD_CYLINDER: -1.00

## 2025-06-10 ASSESSMENT — TONOMETRY
OS_IOP_MMHG: 13
IOP_METHOD: GOLDMANN APPLANATION
OD_IOP_MMHG: 15

## 2025-06-10 ASSESSMENT — CONF VISUAL FIELD
OS_INFERIOR_NASAL_RESTRICTION: 0
OD_NORMAL: 1
OD_SUPERIOR_TEMPORAL_RESTRICTION: 0
OD_INFERIOR_TEMPORAL_RESTRICTION: 0
OS_INFERIOR_TEMPORAL_RESTRICTION: 0
OS_SUPERIOR_TEMPORAL_RESTRICTION: 0
OS_SUPERIOR_NASAL_RESTRICTION: 0
OD_INFERIOR_NASAL_RESTRICTION: 0
OS_NORMAL: 1
OD_SUPERIOR_NASAL_RESTRICTION: 0

## 2025-06-10 ASSESSMENT — VISUAL ACUITY
OS_CC: 20/40-2
CORRECTION_TYPE: GLASSES
METHOD: SNELLEN - LINEAR
OD_CC: 20/25-2

## 2025-06-10 ASSESSMENT — REFRACTION_MANIFEST
OS_SPHERE: -1.75
OD_CYLINDER: -1.25
OS_AXIS: 075
OD_AXIS: 075
OS_CYLINDER: -1.75
METHOD_AUTOREFRACTION: 1
OD_SPHERE: -0.25

## 2025-06-10 ASSESSMENT — EXTERNAL EXAM - RIGHT EYE: OD_EXAM: NORMAL

## 2025-06-10 ASSESSMENT — SLIT LAMP EXAM - LIDS
COMMENTS: 1+ BLEPHARITIS, 2+ COLLARETTES
COMMENTS: 1+ BLEPHARITIS, 2+ COLLARETTES

## 2025-06-10 ASSESSMENT — EXTERNAL EXAM - LEFT EYE: OS_EXAM: NORMAL

## 2025-06-10 NOTE — PROGRESS NOTES
Chalazion left lower eyelid.    Blepharitis: Meibomain gland disease (Effron scale 0-4, 0:no abnormality, 4: thick creamy yellow expression at all gland orifices, expression continuous, conjunctival redness). Collarettes (0-4, 0: 0-2 lashes with collarettes, 1: 3-10, 2: >10 but <1/3rd of lashes, 3: >1/3rd to <2/3rd of lashes, 4: >2/3rd of lashes.  Right eye (OD): stage 2 collarettes: stage 2 eyelid margins displayed  +1 erythema/telangiectasia  Left eye (OS): stage 2 collarettes: stage 2 eyelid margins displayed  +2 erythema/telangiectasia internal hordeolum/deep chalazion.    Start XDEMVY BID x 6 weeks  Start WC Brudermask BID 5-10 minutes. Keep doing every day.   Start doxycline  mg 21 days.     RTC PRN for annual eye exam.

## 2025-06-13 DIAGNOSIS — J30.2 SEASONAL ALLERGIC RHINITIS, UNSPECIFIED TRIGGER: ICD-10-CM

## 2025-06-17 RX ORDER — LORATADINE 10 MG/1
10 TABLET ORAL DAILY
Qty: 90 TABLET | Refills: 1 | Status: SHIPPED | OUTPATIENT
Start: 2025-06-17

## 2025-07-31 DIAGNOSIS — E03.9 HYPOTHYROIDISM, UNSPECIFIED TYPE: ICD-10-CM

## 2025-07-31 RX ORDER — LEVOTHYROXINE SODIUM 88 UG/1
88 TABLET ORAL DAILY
Qty: 30 TABLET | Refills: 0 | Status: SHIPPED | OUTPATIENT
Start: 2025-07-31

## 2025-08-19 ENCOUNTER — PATIENT OUTREACH (OUTPATIENT)
Dept: CARE COORDINATION | Facility: CLINIC | Age: 55
End: 2025-08-19
Payer: COMMERCIAL

## 2025-08-19 DIAGNOSIS — Z12.31 ENCOUNTER FOR SCREENING MAMMOGRAM FOR BREAST CANCER: ICD-10-CM

## 2025-12-18 ENCOUNTER — APPOINTMENT (OUTPATIENT)
Dept: PRIMARY CARE | Facility: CLINIC | Age: 55
End: 2025-12-18
Payer: COMMERCIAL